# Patient Record
(demographics unavailable — no encounter records)

---

## 2017-06-28 NOTE — NUR
BROUGHT TO BED 4. PT REFERRED TO ER FROM PCP FOR EVALUATION OF ELEVATED BLOOD 
SUGAR. BLOOD SUGAR UPON ARRIVAL TO ER  521 POST-PRANDIAL

PT DENIES N/V/D; SKIN IS PINK/WARM/DRY; AAOX4 WITH EVEN AND STEADY GAIT; LUNGS 
CLEAR BL; HR EVEN AND REGULAR; PT DENIES ANY FEVER, CP, SOB, OR COUGH AT THIS 
TIME; PATIENT STATES PAIN OF 0/10 AT THIS TIME; VSS; PATIENT POSITIONED FOR 
COMFORT; HOB ELEVATED; BEDRAILS UP X2; BED DOWN. ER MD MADE AWARE OF PT STATUS.

## 2019-12-31 NOTE — NUR
Patient will be admitted to care of DR LUNA . Admited to MS.  Will go to room 
106A. Belongings list completed.  Report to DEIDRA MCMAHAN, STEVE MCMAHAN .

## 2019-12-31 NOTE — NUR
PT IN BED AOX4, SHE STATES MODERATE PAIN IN VAGINAL AREA AND STOMACH, AND SOME NAUSEA. PT 
HAD FINISHED 90% OF EVENING MEAL. PT GIVEN 1 TAB NORCO AND ZOFRAN FOR C/O OF NAUSEA. PT HAS 
2 IV SITES; 20G ON RAC AND L HAND 22G. BOTH IV SITES ARE INTACT AND ASYMPTOMATIC L HAND HAS 
22G RUNNING NORMAL SALINE AT 80MLS/HR. V/S AS FOLLOWS: T 98.4 P 83 R 18 B/P 129/62 02 97% ON 
ROOM AIR. FINGERSTICK . ALL UNIVERSAL PRECAUTIONS IN PLACE.

## 2019-12-31 NOTE — NUR
RECEIVED REPORT FORM DEIDRA MCMAHAN DAYSHIFT NURSE AT BEDSIDE FOR CONTINUITY OF CARE, PT IN 
STABLE CONDITION.

## 2019-12-31 NOTE — NUR
VANCOMYCIN 1 GM RECONSTITUTED WITH D5 HUNG AND RUNNING  AS ORDERED. EDUCATION 
REGARDING MEDICATION PROVIDED AT BEDSIDE INCLUDING SIDE EFFECTS. PT GIVEN ORDERED LANTUS AND 
HUMALOG COVERAGE AS PER S/S  FOR FINGERSTICK .

## 2019-12-31 NOTE — NUR
PATIENT ARRIVED FROM ER VIA WHEELCHAIR, ABLE TO AMBULATE TO [facility] BED. AAOX4, CALM, 
COOPERATIVE, SKIN COLOR APPROPRIATE TO ETHNICITY, WARM TO TOUCH. HAS RIGHT LABIA WOUND, 
DRESSING DRY AND INTACT. RESPIRATIONS EVEN, UNLABORED, ON ROOM AIR. REVIEWED PLAN OF CARE 
WITH PATIENT. PATIENT VERBALIZED UNDERSTANDING. ORIENTED PATIENT TO ROOM AND CALL LIGHT. 
SAFETY MEASURES IN PLACE. WILL CONTINUE TO MONITOR.

## 2019-12-31 NOTE — NUR
INTRODUCED SELF TO PATIENT. PT VOMITING AND COMPLAINING OF ABD PAIN 10/10. 
WAITING ON PAIN MEDS TO BE ORDERED FROM MD.

## 2019-12-31 NOTE — NUR
REEVALUATED PATIENTS PAIN/NAUSEA LEVEL. REPORTS DECREASED PAIN AND NAUSEA, 
RATING 5/10, SHARP ABDOMINAL PAIN.

## 2019-12-31 NOTE — NUR
PT LAYING IN BED, FAMILY AT BEDSIDE. PT C/O WORSENING ABD PAIN AND NAUSEA, DR GONSALVES AT BEDSIDE. PT WAS GIVEN MEDS AS ORDERED WITH EDUCATION, PT VERBALIZED 
UNDERSTANDING. ALL NEEDS MET.

## 2019-12-31 NOTE — NUR
BIB FAMILY FOR FOR WOUND CHECK. SEEN HERE 12/28/19 FOR BARTHOLIN'S ABSCESS . 
ALSO C/O N/V 4 EPISODES TODAY. BLOOD SUGAR 342 AT THIS TIME. ABD PAIN 10/10



MED HX: DM,  HTN, VAGINAL CYST.

## 2019-12-31 NOTE — NUR
PT LAYING IN BED, FAMILY AT BEDSIDE. PT REPORTS IMPROVEMENT IN PAIN RELIEF, 
TOLERABLE 5/10 PAIN ON LOWER ABD. DENIES NAUSEA. VS NOTED. ALL NEEDS MET.

## 2020-01-01 NOTE — NUR
RECEIVED PT IN STABLE CONDITION FROM AM NURSE. AWAKE,ALERT AND ORIENTED X4. ON M/S .FAMILY 
AT BEDSIDE. WITH IVF INFUSING WELL ON THE LT HAND G#18. CLEAR AND PATENT.  PLAN OF CARE 
DISCUSSED AND VERBALIZED UNDERSTANDING. BED ON LOW POSITION. FREQ ROUNDS ,SIDE RAILS UP X2. 
CALL LIGHT PLACED WITHIN EASY REACH. WITH NO C/O PAIN AT THIS TIME. WILL CONTINUE TO 
MONITOR.

## 2020-01-01 NOTE — NUR
HOURLY ROUNDING. FAMILY AT BEDSIDE. PT RESTING IN BED UPON ARRIVAL. RESPIRATIONS EVEN AND 
UNLABORED WITH NO SOB OR RESPIRATORY DISTRESS. SKIN WARM AND DRY. ABLE TO MAKE NEEDS KNOWN. 
SAFETY MEASURES IN PLACE. WILL CONTINUE TO MONITOR

## 2020-01-01 NOTE — NUR
BLOOD SUGAR WAS CHECKED RESULT 80. PROVIDED WITH SOME SNACK. TOLERATED WELL. WILL CONTINUE 
TO MONITOR.

## 2020-01-01 NOTE — NUR
PT AGITATED AND HAS C/O OF 8/10 PAIN SHE WAS ALSO COMPLAINING OF UNRELIEVED NAUSEA. MD LUNA 
PAGED AWAITING CALL BACK.

## 2020-01-01 NOTE — NUR
PT IN BED RESTING WITH EYES CLOSED BUT AROUSABLE TO NAME. PT HAS NO C/O OF PAIN AND NO S/S 
OF PAIN OR DISTRESS AT THIS TIME. IV SITE INTACT AND RUNNING NS AS ORDERED. V/S AS FOLLOWS: 
T 97.0 P 79 R 18 B/P 153/90 02 98% ON ROOM AIR.ALL UNIVERSAL PRECAUTIONS IN PLACE .

## 2020-01-01 NOTE — NUR
[facility]. IT DID NOT MEET THE CRITERIA 
OF HIGH NUTRITION RISK PER HOSPITAL POLICY. PT WILL BE ASSESSED PER HOSPITAL POLICY. 



1/1/20 



FRANCI COX [address]

## 2020-01-01 NOTE — NUR
PT IS CRYING IN PAIN AND FEELS THE NORCO IS NOT WORKING. DR. LUNA WAS PAGED ABOUT CHANGING 
HER PAIN MEDICATION SO THAT HER PAIN IS TOLERABLE. WILL CONTINUE TO MONITOR

## 2020-01-01 NOTE — NUR
HOURLY ROUNDING. PT ASLEEP IN BED WITH FAMILY AT BEDSIDE. RESPONSIVE TO VERBAL AND TACTILE 
STIMULI. ABLE TO MAKE NEEDS KNOWN. SKIN WARM AND DRY TO TOUCH. RESPIRATIONS EVEN AND 
UNLABORED WITH NO SOB OR RESPIRATORY DISTRESS. SAFETY MEASURES IN PLACE. FREQ ROUNDS MADE. 
WILL CONTINUE TO MONITOR.

## 2020-01-01 NOTE — NUR
PT CALLED AND COMPLAINED OF NAUSEA. PRN ANTI-NAUSEA MEDICATION PRESCRIBED AS PER MD ORDER. 
PT TOLERATED WELL. MEDICATION EDUCATION PERFORMED. PT VERBALIZED UNDERSTANDING AND COULD 
TEACH BACK

## 2020-01-01 NOTE — NUR
MD LUNA WAS PAGED AND ADDITION TIME AND STILL AWAITING A RETURN CALL. PT WAS GIVEN IVP/PRN  
ATIVAN FOR AGITATION.

## 2020-01-01 NOTE — NUR
HOURLY ROUNDING. PT RESTING IN BED UPON ARRIVAL WITH FAMILY AT BEDSIDE. ABLE TO MAKE NEEDS 
KNOWN. SKIN WARM AND DRY TO TOUCH. RESPIRATIONS EVEN AND UNLABORED WITH NO SOB OR 
RESPIRATORY DISTRESS. SAFETY MEASURES IN PLACE. WILL CONTINUE TO MONITOR

## 2020-01-01 NOTE — NUR
ENDORSED TO NIGHTSHIFT NURSE. PT RESTING IN BED UPON ARRIVAL WITH FAMILY AT BEDSIDE. ABLE TO 
MAKE NEEDS KNOWN. SKIN WARM AND DRY TO TOUCH. RESPIRATIONS EVEN AND UNLABORED WITH NO SOB OR 
RESPIRATORY DISTRESS. SAFETY MEASURES IN PLACE. PT IS STABLE

## 2020-01-01 NOTE — NUR
ADMINISTERED MEDICATION AS PRESCRIBED PER MD ORDER. PT TOLERATED WELL. MEDICATION EDUCATION 
PERFORMED. PT VERBALIZED UNDERSTANDING AND COULD TEACH BACK. BLOOD SUGAR . SAFETY 
MEASURES IN PLACE. WILL CONTINUE TO MONITOR

## 2020-01-01 NOTE — NUR
PT C/O PAIN AND NAUSEA WAS GIVEN PRN NORCO AND PRN IVP ZOFRAN. PT FINGERSTICK , SHE 
WAS GIVEN 4 UNITS OF COVERAGE AND ORDERED HUMALOG. ALL UNIVERSAL PRECAUTIONS IN PLACE.

## 2020-01-01 NOTE — NUR
BLOOD SUGAR OBTAINED AND RESULTED . 20 UNITS OF LISPRO HELD. PT TOLERATED WELL. SAFETY 
MEASURES IN PLACE.

## 2020-01-01 NOTE — NUR
RECEIVED BEDSIDE REPORT FROM NIGHTSHIFT NURSE. PT ASLEEP IN BED UPON ARRIVAL. SKIN WARM AND 
DRY TO TOUCH. RESPONSIVE TO VERBAL AND TACTILE STIMULI. ABLE TO MAKE NEEDS KNOWN. 
RESPIRATIONS EVEN AND UNLABORED WITH NO SOB OR RESPIRATORY DISTRESS. IV SITE IN RAC 22G AND 
L HAND 22G ARE CLEAN, DRY, AND INTACT. SAFETY MEASURES IN PLACE. WILL CONTINUE TO MONITOR.

## 2020-01-01 NOTE — NUR
PT HAS BEEN SCREENED AND CATEGORIZED AS HIGH NUTRITION RISK. PT WILL BE SEEN WITHIN 1-2 DAYS 
OF ADMISSION. 



1/1/20-1/2/20



FRANCI COX RD

## 2020-01-01 NOTE — NUR
DR. LUNA CALLED BACK AND INSTRUCTED TO ADMINISTER 6MG IV MORPHINE Q4H FOR SEVERE PAIN PRN 
INSTEAD OF THE NORCO.

## 2020-01-01 NOTE — NUR
PT CALLED AND COMPLAINED OF PAIN. PRN PAIN MEDICATION ADMINISTERED AS PER MD ORDER. PT 
TOLERATED WELL. VERBALIZED UNDERSTANDING. SAFETY MEASURES IN PLACE. WILL CONTINUE TO 
MONITOR.

## 2020-01-01 NOTE — NUR
YONI HUNG AND RUNNING AS ORDERED. PT C/O PAIN AND NAUSEA, SHE  WAS GIVEN 1 TAB OF NORCO 
PO/PRN AND IVP ZOFRAN FOR NAUSEA. PT VOMITED X1 . V/S AS FOLLOWS: T 98.1 P 79 R 18 B/P 
148/78 02 94% ON ROOM AIR. VAGINAL LABIA NOTED WITH REDNESS AND MINIMAL AMOUNT OF YELLOW 
DRAINAGE. UNIVERSAL FALLS PRECAUTIONS IN PLACE. 

-------------------------------------------------------------------------------

Addendum: 01/01/20 at 0426 by Libia Rodrigues RN

-------------------------------------------------------------------------------

TIME OF NOTES 0050

## 2020-01-01 NOTE — NUR
HOURLY ROUNDING. PT ASLEEP IN BED. RESPONSIVE TO VERBAL AND TACTILE STIMULI. ABLE TO MAKE 
NEEDS KNOWN. SKIN WARM AND DRY TO TOUCH. RESPIRATIONS EVEN AND UNLABORED WITH NO SOB OR 
RESPIRATORY DISTRESS. SAFETY MEASURES IN PLACE. FREQ CHECKS MADE

## 2020-01-02 NOTE — NUR
RECEIVED PT REPORT FROM NIGHT RN. PT IS FULL CODE, NKA. IV IS ON LEFT FA 18G WITH NS 
INFUSING AT 80. DR. WEI AT BEDSIDE,MOTHER AT BEDSIDE. PT IS AMBULATORY, AAOX4. ON RA, 
WILL REVIEW AND CONTINUE WITH PLAN OF CARE FOR THE DAY.

## 2020-01-02 NOTE — NUR
Late entry. Confirmed with RN that 0.9 NS IV completed at 1300, another 0.9 NS 
completed at 1545, another 0.9 NS completed at 1650. Rocephin IVPB completed at 
1640.

## 2020-01-02 NOTE — NUR
GAVE REPORT TO NIGHT SHIFT RN. ENDORSED PT TO NIGHT SHIFT FOR CONTINUITY OF CARE. PT IN 
STABLE CONDITION.

## 2020-01-02 NOTE — NUR
ADMINISTERED MORNING MEDICATIONS. GAVE ZOFRAN FOR NAUSEA AND VOMITING. GAVE MORPHINE FOR 
PAIN 10/10. WILL REASSES PAIN LEVEL

## 2020-01-02 NOTE — NUR
RESULT OF VANCOMYCIN TROUGH LEVEL 14.7. TALKED TO SHARDA,PHARMACIST  . HE SAID OK TO GIVE 
THE DOSE TONIGHT.

## 2020-01-02 NOTE — NUR
GAVE PT ZOFRAN AND MORPHINE DUE TO PAIN AND NAUSEA/VOMITING. FAMILY AT BEDSIDE. NO OTHER 
COMPLAINTS AT THIS TIME.

## 2020-01-02 NOTE — NUR
RECEIVED PT IN STABLE CONDITION FROM AM NURSE. MED SURG PT. AWAKE,ALERT AND ORIENTED X4. 
STILL WITH C/O PAIN ANS NAUSEA. DR. WEI CAME AND SAID WILL ORDER  ROUTINE PAIN MED . 
WITH IVF INFUSING WELL ON THE LT HAND g#22. CLEAR AND PATENT.  AMBULATORY TO THE BATHROOM. 
BED ON LOW POSITION . SIDE RAILS UP X2. CALL LIGHT PLACED WITHIN EASY REACH. WILL FOLLOW UP 
NEW MD ORDERS AND WILL CONTINUE TO MONITOR.

## 2020-01-02 NOTE — NUR
PT A LITTLE ANXIOUS. ASSIST TO TAKE DEEP BREATH  AND RELAX.  IT SEEMS TO HELP. PT READY TO 
GET SOME ASLEEP.

## 2020-01-02 NOTE — NUR
ABLE TO TALKED TO DR. WEI . CLARIFIED WITH HIM ABOUT THE TORADOL. HE SAID TO START 
TONIGHT. HE WAS MADE AWARE ALSO THAT PT REFUSED THE PERCOCET FOR SHE ALWAYS VOMIT EVERY TIME 
SHE TOOK PILLS.

## 2020-01-02 NOTE — NUR
BLOOD SUGAR , NO INSULIN NEEDED. WITHHELD SCHEDULED HUMALOG 20 UNITS FOR BLOOD SUGAR 
132 WITHIN NORMAL LIMITS.

## 2020-01-02 NOTE — NUR
Note:



SW met with patient at bedside to complete assessment. Patient was sedated and was unable to 
complete assessment. SW will follow up with patient at a later time.

-------------------------------------------------------------------------------

Addendum: 01/02/20 at 1402 by Tony Szymanski 

-------------------------------------------------------------------------------

Basic Screen: 

Yes

High Risk DC Screen 

No

 Name: 

MARCO DINERO

[place] Tel: 

[phone]

Relationship: 



Pre-Admission Living Arrangements: 

Lives with Other

Prior ADL 

Needs Assistance

Current Home Health Name/Tel: 

N/A

Current DME/02 Name/Tel: 

N/A

Current Hospice Name/Tel: 

N/A

Current Dialysis Name/Tel: 

N/A

Healthcare Decision Maker: 

Patient

Advance Directive 

No - REFUSED

Physician Orders for Life Sustaining Treatment Form 

No

Patient/Family Have Educational Needs 

No

Information Taught: 

 Advance Directive

Person Taught: 

Patient

Spouse

Teaching Tools: 

Verbal

Factors Affecting Learning: 

None

Participation Level: 

Refused

Evaluation: 

Verbalizes Understanding

Needs Additional Education: 

No

Discipline: 

Case Mgt/Social Svcs

Tentative Discharge Plan/Destination: 

No Needs Identified

Will require assistance post discharge: 

No

 Referred to : 

No

Tentative Discharge Plan Summary: 

Patient is a 25-year-old female admitted for hyperglycemia. Patient has 

PMHX of obesity. Patient was admitted from home. SW met with patient and 

patient's  Marco Dinero at bedside to verify demographics. 

Patient reported that she seens Dr. Jay Ward and was last seen 2 

weeks ago. Patient stated that she lives at her address with , 

mother, and brother. Patient stated that she needs assistance with 

bathing. Patient reported mental health history of anxiety and depression. 

SW provided patient mental health resources. Patient reported no history 

of substance abuse. SW provided education on advanced directive but 

patient refused. Patient stated that her plan after discharge is to return 

home. No further needs identified.

 Signature: 

KADEEM Salinas

Date: 

Jan 2, 2020

Time: 

13:58

## 2020-01-03 NOTE — NUR
DISCHARGE PLANNIN Y/O FEMALE PATIENT FROM HOME, WHO CAME IN DUE S/P I&D FOR A BARTHOLIN'S ABSCESS 3 DAYS 
PRIOR.  NO PERTINENT MEDICAL HISTORY.  INITIAL DIAGNOSIS OF HYPERGLYCEMIA.  CURRENT LABS 
INCLUDE WBC 11.6, H/H 8.7/26.7, NA/K 136/4.3, BUN/CREA 18/2.2 AND ALB 1.3.  CT ABD AND 
PELVIS SHOWED A SMALL SUPERFICIAL ABSCESS AND PROMINENT INGUINAL LYMPH NODES.  CURRENT MEDS 
INCLUDE ON VANCO AND LEVOFLOXACIN. DC PLAN IS PENDING ON PATIENT'S RESPONSE TO TREATMENT.

-------------------------------------------------------------------------------

Addendum: 20 at 0858 by Reyna Murphy CM

-------------------------------------------------------------------------------

DC PLANNING:

 I & D PERFORMED BY DR DIMPLE ALFREDO ,CONTINUE IV LEVAQUIN AND FLAGYL DC PLAN TO GO HOME WHEN 
STABLE CM TO FOLLOW 

-------------------------------------------------------------------------------

Addendum: 20 at 1403 by Reyna Murphy CM

-------------------------------------------------------------------------------

DC PLANNING:

FAXED THE WOUND VAC  AND HOME HEALTH REQUEST TO [facility] 

-------------------------------------------------------------------------------

Addendum: 20 at 1604 by Reyna Murphy CM

-------------------------------------------------------------------------------

DC PLANNING:

 SPOKE WITH TOMA MILLER REGARDING HOME HEALTH FOR WOUND CARE AND WOUND VACK CONTACTED [facility] SPOKE WITH [place]  AND FAXED ALL THE INQUIRY  [phone]

-------------------------------------------------------------------------------

Addendum: 20 at 4475 by Reyna Murphy CM

-------------------------------------------------------------------------------

DC PLANNING:

 I RECEIVED A CALL FROM [facility] , STATED THEY ARE UNABLE TO ACCEPT 
PATIENT, [facility] CAN NOT ACCEPT PT . WILL TRY OTHER HOME HEALTH 


-------------------------------------------------------------------------------

Addendum: 20 at 1359 by Elizabeth Reyes 

-------------------------------------------------------------------------------

I faxed referral to [facility] home health, [facility] [place] [facility], fax [phone], phone 
number [phone]. Per Sakshi from [facility] [phone], they are able to 
accept referral and they will send a nurse to patient's home. She stated they will obtain 
authorization from insurance.



I called and spoke with  Kristyn Washington from [facility] 
[phone], she requested wound vac order, fax [phone]. I faxed order and HNP. 

-------------------------------------------------------------------------------

Addendum: 20 at 1611 by Reyna Murphy 

-------------------------------------------------------------------------------

DC PLANNING:

CALLED ALIYA AT [facility] AND CLARIFIED THE IF PT CAN GO WITH THE SAME WOUND VAC AND OK TO GET THE 
AUTHORIZATION. PER ALIYA CEDILLO TO DC PATIENT WITH THE SAME WOUND VAC HE ALREADY GOT THE 
AUTHORIZATION FROM [facility][note 2], AND HE WILL CONTACT THE [facility]

## 2020-01-03 NOTE — NUR
WOUND CARE EVALUATION NOTES:

REASON FOR EVALUATION:  S/P I&D WOUND

WOUND ASSESSMENT DONE WITH THIS 26 Y/O FEMALE PATIENT ADMITTED TO [facility] WITH INITIAL 
DIAGNOSIS OF S/P I&D ABSCESS.  PAST MEDICAL HX DM AND HX OF BARTHOLINS CYST. ALL ABOVE 
INFORMATION WAS OBTAINED FROM THE ADMISSION H&P. AND PT. PT IS AAX4. MOTHER AT BED SIDE. 
DRESSING CHANGED AND WOUND CARE INSTRUCTIONS GIVEN WITH DEMONSTRATION. BOTH PT. AND MOTHER 
VERBALIZES UNDERSTANDING. PLAN OF CARE DISCUSSED WITH PRIMARY RN AND PT. PT. VERBALIZES 
UNDERSTANDING. POC DISCUSSED WITH DR. LUNA, PER DR. LUNA WILL HAVE SURGEON FOR FOLLOW UP.



INTEGUMENTARY:

-INCISION WOUND TO RIGHT LABIA, 2X3X0.8CM WOUND BED YELLOW AND BROWN TISSUE, SMALL AMOUNT 
PURULENT DRAINAGE, MILD ODOR, WOUND EDGE FLAT, WITH GEORGE-WOUND SKIN DARKER ERYTHEMA, 
INDURATION, WARM AND PAINFUL TO TOUCH 



RECOMMENDATIONS:

-SURGEON TO FOLLOW UP

-COLD COMPRESS PER PROTOCOL TO RIGHT LABIAL AREA

-CLEANSE RIGHT LABIA WOUND WITH NS. PAT DRY APPLY SILVASORB GEL AND COVER WITH DRY DRESSING, 
QD AND PRN IF SOILING

-KEEP AREA DRY AND CLEAN AT ALL TIME

-PLEASE FOLLOW UP WITH PCP 7-10 DAYS AFTER DISCHARGED



RECOMMENDATIONS DISCUSSED WITH PRIMARY RN. 

PLEASE CONTACT WOUND CARE NURSE FOR ANY QUESTIONS AND CHANGES IN SKIN CONDITION.

## 2020-01-03 NOTE — NUR
PT C/O PAIN . REFUSED THE PERCOCET AND TORADOL NOT DUE YET. MORPHINE IVP GIVEN AS ORDERED 
PRN FOR PAIN 10/10 . WILL CONTINUE TO MONITOR.

## 2020-01-03 NOTE — NUR
PT C/O NAUSEA. MEDICATED WITH ZOFRAN IVP. AFTER PT ALSO GIVEN ROUTINE PAIN MED TORADOL. WILL 
CONTINUE TO MONITOR..

## 2020-01-03 NOTE — NUR
AM HUMALOG NOT GIVEN BLOOD SUGAR 135. BUT PT UNABLE TO EAT DUE TO NAUSEA VOMITING. WILL 
ENDORSE TO AM NURSE.

## 2020-01-03 NOTE — NUR
RECEIVED FROM AM RN IN BED SITTING UP AWAKE AND ALERT. MALE VISITOR AT BEDSIDE. PT. 
VERBALIZING WELL. NO COMPLAINTS AT THIS TIME. IVF SITE INTACT AND NO S/S OF INFILTRATION. 
CALL LIGHT WITH IN REACH AND CARE PLANS FOR THE NIGHT DISCUSSED WITH HER. ENCOURAGED TO CALL 
FOR ANY HELP SHE MAY NEED. PT. FOR I AND D RIGHT LABIAL ABSCESS  TOMORROW AS PLANNED WITH 
MD. JD MUSA.

## 2020-01-03 NOTE — NUR
PT. CRYING AT THIS TIME AND WANTING PAIN RELIEVER. MEDICATED AS REQUESTED RT PAIN TO RIGHT 
VAGINAL LABIA. FOR I AND D TOMORROW BY MD. JD MUSA

## 2020-01-03 NOTE — NUR
RECEIVED PT IN STABLE CONDITION FROM NIGHT SHIFT NURSE. MED SURG PT. AAOX4. STILL WITH C/O 
PAIN ANS NAUSEA. WITH IVF INFUSING WELL ON THE LT HAND g#22. CLEAR AND PATENT.  AMBULATORY 
TO THE BATHROOM. BED ON LOW POSITION . SIDE RAILS UP X2. CALL LIGHT PLACED WITHIN EASY 
REACH. WILL FOLLOW UP NEW MD ORDERS AND WILL CONTINUE TO MONITOR.

## 2020-01-03 NOTE — NUR
SPOUSE WENT HOME AND MOTHER CAME IN TO VISIT. ADMINISTERED ANTI NAUSEA MEDICATION ZOFRAN IVP 
RT "I FEEL LIKE VOMITING" AWAKE AND LAERT. ABLE TO VERBALIZE NEEDS WELL.

## 2020-01-04 NOTE — NUR
GIVEN 2 UNITS INSULIN FOR BLOOD SUGAR 153 IN THE LEFT INNER ARM. PATIENT TOLERATED WELL. 
WILL CONTINUE TO MONITOR

## 2020-01-04 NOTE — NUR
VITAL SIGNS TAKEN. PATIENT C/O PAIN 7/10 RIGHT LABIAL AREA. PATIENT WAS MEDICATED WITH 
MORPHINE 1509. PATIENT RESPIRATIONS EVEN AND UNLABORED. MOM AT BEDSIDE. WILL CONTINUE TO 
MONITOR

## 2020-01-04 NOTE — NUR
SLEEPING. MOTHER AT BEDSIDE. PT. AWARE OF NPO STATUS FOR I AND D PROCEDURE THIS A.M. CONSENT 
FOR I AND D SIGNED BY PT.

## 2020-01-04 NOTE — NUR
GIVEN MORPHINE AND PHENERGAN VIA IVP. EXPLAINED TO PATIENT INDICATIONS. PATIENT VERBALIZED 
UNDERSTANDING. WILL REASSESS PAIN. MOM IS AT BEDSIDE

## 2020-01-04 NOTE — NUR
VITAL SIGNS TAKEN. 2X2 NECROTIC TISSUE NOTED IN THE RIGHT LABIAL AREA. PATIENT STATED PAIN 
IN THE AREA 10/10. WILL CONTINUE TO MONITOR. BED IN LOW POSITION. CALL LIGHT IS WITHIN 
REACH.  AT BEDSIDE

## 2020-01-04 NOTE — NUR
GIVEN PERCOCET PO, CRUSHED IN APPLESAUSE. GIVEN TORADOL VIA IVP. EDUCATED PATIENT REGARDING 
MEDS AND SIDE EFFECTS. PATIENT TOLERATED WELL. PATIENT VERBALIZED UNDERSTANDING. WILL 
CONTINUE TO MONITOR. BED IN LOW POSITION. CALL LIGHT WITHIN REACH.  AT BEDSIDE

## 2020-01-04 NOTE — NUR
RECEIVED PATIENT FROM NIGHT SHIFT NURSE. PATIENT IS LAYING IN BED, FACIAL GRIMACING, CRYING, 
AND RESTLESS. PATIENT STATED LABIAL PAIN OF 10/10, ACHING. AAOX4. RESPIRATIONS EVEN AND 
UNLABORED, ON ROOM AIR. VISIBLE CHEST RISE NOTED. ABDOMEN SOFT AND NONTENDER. N/V NOTED. IV 
ON THE LEFT FOREARM RUNNING NS AT 80 ML/HR. IV PATENT AND INTACT. RIGHT LABIAL SWELLING 
NOTED. PATIENT IS IN BEDREST. BED IN LOW POSITION. CALL LIGHT IS WITHIN REACH

## 2020-01-04 NOTE — NUR
GIVEN MORPHINE FOR LABIAL PAIN 10/10 AND AND ZOFRAN FOR N/V. EXPLAINED TO PATIENT 
INDICATIONS AND SIDE EFFECTS. PATIENT VERBALIZED UNDERSTANDING. WILL CONTINUE TO MONITOR

## 2020-01-04 NOTE — NUR
SLEPT WELL WITH MOTHER AT BEDSIDE. REQUESTING FOR ANTI NAUSEA RT STATED SHE REALLY FEEL 
NAUSEOUS AFTER THE PAIN RELIEVERS. PT. STATED SHE DO NOT WANT ANY PAIN RELIEVERS FOR NOW.

## 2020-01-04 NOTE — NUR
1/4/2020 RD INITIAL ASSESSMENT COMPLETED

PLEASE REFER TO NUTRITION ASSESSMENT UNDER CARE ACTIVITY FOR ESTIMATED NUTRITIONAL NEEDS. 



RD RECOMMENDATIONS:



1. WHEN MEDICALLY APPROPRIATE, CONSIDER CLEAR LIQUID DIET AND ADVANCE AS TOLERATED TO CCHO 
60 GM DIET. 

2. IF PT CONTINUES WITH POOR PO INTAKE, CONSIDER GLUCERNA SHAKE BID TO OPTIMIZE NUTRITION 
INTAKE. 

3. RD WILL F/U 2-3 DAYS; HIGH RISK. 



JE SAUCEDO, AKIL

## 2020-01-04 NOTE — NUR
RECEIVED FROM AM RN IN BED SLEEPING. MOTHER AT BEDSIDE. CALL LIGHT WITH IN REACH. NO SOB. NO 
RESTLESSNESS. IVF SITE INTACT AND NO INFILTRATIONNOTED.

## 2020-01-05 NOTE — NUR
GIVEN SCHEDULED TORADOL AND PERCOCET. /56, HR 78, O2SAT 95%. EXPLAINED TO PATIENT 
INDICATION. PATIENT VERBALIZED UNDERSTANDING. MOM OF PATIENT IS AT BEDSIDE.

## 2020-01-05 NOTE — NUR
GIVEN NORCO FOR PAIN. GIVEN AMLODIPINE FOR HTN. EXPLAINED TO PATIENT INDICATIONS AND SIDE 
EFFECTS. PATIENT VERBALIZED UNDERSTANDING. WILL CONTINUE TO MONITOR

## 2020-01-05 NOTE — NUR
RECEIVED BEDSIDE REPORT FROM AM SHIFT NURSE. PATIENT IS LYING ON BED, AWAKE AND ALERT WITH 
FAMILY MEMBER AT BEDSIDE. NO SOB OR DISTRESS NOTED. ON ROOM AIR. IV ACCESS ON LEFT HAND, 
PATENT AND INTACT. ROBB CATHETER IN PLACE. BED IN LOW, SAFETY MEASURES IN PLACE. ON CONTACT 
PRECAUTION. BOARD UPDATED. CALL LIGHT PLACED WITHIN PATIENT REACH. WILL CONTINUE TO MONITOR 
PATIENT.

## 2020-01-05 NOTE — NUR
CHANGED SURGICAL SITE DRESSING PER MD ORDER. PACKED WOUND WITH KERLEX, WET TO DRY. SEROUS 
DRAINAGE NOTED. MEDICATED PATIENT WITH MORPHINE PRIOR. PATIENT TOLERATED DRESSING CHANGED 
WELL.

## 2020-01-05 NOTE — NUR
PATIENT BLEEDING ON THE SURGICAL SITE. CHANGED DRESSING. COVERED WITH ABDOMINAL PAD. PATIENT 
REPORTS NO DIZZINESS. PATIENT REPORT PAIN 8/10 IN THE SURGICAL SITE. WILL MEDICATE

## 2020-01-05 NOTE — NUR
MEDICATED WITH ONLY 1 TABLET OF PAIN RELIEVER AS REQUESTED AND TORADOL IVP . ANTI NAUSEA 
ADMINISTERED. ABLE TO VERBALIZE NEEDS WELL. BLOOD SUGAR CHECK DONE PER FINGERSTICK AND WNL. 
CALL LIGHT WITH IN REACH.

## 2020-01-05 NOTE — NUR
RECEIVED PATIENT FROM NIGHT SHIFT NURSE. PATIENT IS LAYING IN BED. AAOX4. RESPIRATIONS EVEN 
AND UNLABORED, ON ROOM AIR. VISIBLE CHEST RISE NOTED. ABDOMEN SOFT AND NONTENDER. IV ON THE 
LEFT FOREARM RUNNING NS AT 80 ML/HR. IV PATENT AND INTACT. S/P RIGHT LABIAL I&D. DRESSING 
INTACT. NO DRAINAGE NOTED. PATIENT IS IN BEDREST. BED IN LOW POSITION. CALL LIGHT IS WITHIN 
REACH

## 2020-01-05 NOTE — NUR
PT. URINATED IN BEDSIDE COMMODE. DRESSING OUT AND WET WITH URINE AND BLOOD. NEW DRESSING IN 
PLACE. CLEANED PT. AND MADE COMFORTABLE. MOTHER AT BEDSIDE. CALL LIGHT WITH IN REACH. ABLE 
TO VERBALIZE NEEDS WELL.

## 2020-01-05 NOTE — NUR
GIVEN VANCO VIA IVPB. EXPLAINED TO PATIENT MED. PATIENT VERBALIZED UNDERSTANDING. WILL 
CONTINUE TO MONITOR

## 2020-01-05 NOTE — NUR
GIVEN ZOSYN VIA IVPB AND MORPHINE VIA IVP. BP /55, HR 76, OSAT 95%. EXPLAINED TO 
PATIENT INDICATION. PATIENT VERBALIZED UNDERSTANDING. WILL CONTINUE TO MONITOR

## 2020-01-05 NOTE — NUR
ROUNDS DONE AT THIS TIME. PATIENT IS RESTING WITH EYES CLOSED. NO DISTRESS NOTED. WILL 
CONTINUE TO MONITOR PATIENT.

## 2020-01-05 NOTE — NUR
INSERTED ROBB CATHETER 16Fr TO KEEP SURGICAL SITE FROM CONTAMINATION. EXPLAINED TO PATIENT 
AND  THE PROCEDURE. BOTH VERBALIZED UNDERSTANDING. CLEAR, YELLOW URINE FLOWED IN THE 
BAG. PATIENT IS IN PAIN, CRYING. GIVEN NORCO PRIOR TO INSERTION.

## 2020-01-05 NOTE — NUR
GIVEN MORPHINE AND PHENERGAN VIA IVP. EXPLAINED TO PATIENT INDICATIONS AND SIDE EFFECTS. 
PATIENT VERBALIZED UNDERSTANDING. WILL REASSESS PAIN AND N/V

## 2020-01-05 NOTE — NUR
VITAL SIGNS TAKEN. INFORMED HER THAT SHE HAS ROUTINE PAIN RELIEVERS AND IF SHE WANTS IT. 
STATED" I CAN REALLY TOLERATE IT BUT IT IS JUST WHEN I STAND UP AND URINATE THAT IT HURTS." 
MEDICATED WITH PAIN RELIEVERS AS ORDERED.

## 2020-01-06 NOTE — NUR
WOUND CARE RE-EVALUATION NOTES:

REASON FOR EVALUATION: S/P I&D WOUND

WOUND ASSESSMENT COMPLETED WITH THIS 24 Y/O FEMALE PATIENT S/P I&D AND DEBRIDEMENT OF RIGHT 
LABIAL ABSCESS ON 01/04/20. PATIENT IS AAOX4, COOPERATIVE, AMBULATORY, FAMILY MEMBER AT 
BEDSIDE. SKIN IS WARM TO TOUCH, COLOR APPROPRIATE TO ETHNICITY. ROBB CATHETER IN PLACE, 
INTACT, AND DRAINING YELLOW URINE. WOUND DRESSING CHANGED AND WOUND INSTRUCTIONS GIVEN WITH 
DEMONSTRATION TO PATIENT/FAMILY MEMBER AT BEDSIDE. PLAN OF CARE DISCUSSED WITH 
PATIENT/FAMILY MEMBER AT BEDSIDE AND WITH PRIMARY RN. PATIENT/FAMILY VERBALIZED 
UNDERSTANDING.  

ENCOURAGED FREQUENT AMBULATION. PATIENT VERBALIZED UNDERSTANDING. 



INTEGUMENTARY:

- SURGICAL WOUND TO RIGHT LABIA WITH MEASUREMENTS 10 X 3 X 3 CM. WOUND BED IS CLEAN WITH 
100% GRANULATION, NO ODOR. SMALL SEROSANGUINEOUS DRAINAGE. GEORGE-WOUND SKIN ERYTHEMA, INTACT.





RECOMMENDATIONS:

- WOUND VAC RECOMMENDATION, PENDING ORDER.

- CLEANSED SURGICAL WOUND WITH NS, PAT DRIED, PACKED WITH MOIST TO DRY DRESSING, AND COVERED 
WITH DRY DRESSING DAILY AND PRN IF SOILED. 

- CONTINUE TO KEEP AREA DRY AND CLEAN AT ALL TIMES.

- HOME HEALTH FOLLOW-UP UPON DISCHARGE FOR WOUND VAC TREATMENT.

- FOLLOW-UP WITH SURGEON UPON DISCHARGE.


-------------------------------------------------------------------------------

Addendum: 01/08/20 at 1157 by ALONSO Herron RN (Grace)

-------------------------------------------------------------------------------

CLARIFICATION ORDER

-[facility] VAC THERAPY TO RIGHT LABIA SURGICAL WOUND, FOLLOW V.A.C THERAPY CLINICAL GUIDELINES. 
SET THERAPY ON CONTINUOUS THERAPY  [facility]. CHANGE DRESSING 3X/WK ON MONDAY, WEDNESDAY 
AND FRIDAY, AND PRN IS DISLODGED.

- TO ARRANGE HOME HEALTH NURSE TO FOLLOW UP WOUND VAC DRESSING CHANGE.

## 2020-01-06 NOTE — NUR
ASSISTED WOUND CARE NURSE AT BEDSIDE APPLYING WOUND VAC. PT TOLERATED WELL. BED IN LOW 
POSITION. CALL LIGHT AT BEDSIDE. PT EDUCATED.

## 2020-01-06 NOTE — NUR
GAVE ORDERED DUE MEDICATIONS AT THIS TIME. PT TOLERATED WELL. BED IN LOW POSITION. CALL 
LIGHT AT BEDSIDE.

## 2020-01-06 NOTE — NUR
PT IN BED V/S AS FOLLOWS: T 97.7 P 80 R 18 B/P 154/75 02 93% ON ROOM AIR. PT SITTING UP IN 
BED AOX4, IV SITE INTACT AND ASYMPTOMATIC RUNNING NORMAL SALINE AT 80MLS/HR.WOUND VAC INTACT 
AND DRAINING A VERY SMALL AMOUNT OF SEROSANGUINEOUS DRAINAGE. ALL CONTACT AND UNIVERSAL 
FALLS PRECAUTIONS IN PLACE.

## 2020-01-06 NOTE — NUR
PT FINGERSTICK , GIVEN  4 UNITS OF HUMALOG COVERAGE AS ORDERED. ZOSYN [id number] AND 
RUNNING AS ORDERED.

## 2020-01-06 NOTE — NUR
WOUND VAC APPLY PER MANUFACTURE GUIDELINES, PT. TOLERATE PROCEDURES WELL, NO C/O PAIN, ALL 
QUESTIONS ANSWERED.

## 2020-01-06 NOTE — NUR
RECEIVED REPORT FROM NIGHT SHIFT NURSE FOR CONTINUITY OF CARE. PT IN STABLE CONDITION. 
RESPIRATIONS EVEN AND UNLABORED, ROOM AIR. IV INTACT AND PATENT. SAFETY MEASURES IN PLACE. 
BED IN LOW POSITION. CALL LIGHT AT BEDSIDE. PT ORIENTED TO ROOM. WILL CONTINUE TO MONITOR.

## 2020-01-06 NOTE — NUR
VITALS TAKEN AT THIS TIME. NO DISTRESS NOTED. CALL LIGHT WITHIN PATIENT REACH. WILL CONTINUE 
TO MONITOR PATIENT.

## 2020-01-06 NOTE — NUR
PT AGAIN C/O OF NAUSEA AND WAS GIVEN PHENERGAN /PRN /IVP AS WELL AS ORDERED PERCOCET AND 
TORADOL. FINGERSTICK , PT GIVEN 2 UNITS OF HUMALOG COVERAGE.

## 2020-01-06 NOTE — NUR
PT SLEEP AT THIS TIME. RESPIRATIONS EVEN AND UNLABORED, ROOM AIR. BED IN LOW POSITION. CALL 
LIGHT AT BEDSIDE.

## 2020-01-06 NOTE — NUR
PT GIVEN ORDERED  AND SCHEDULED PERCOCET 2 TABS OF 5/325 AS WELL AS ORDERED TORADOL IVP. V/S 
AS FOLLOWS: T 97.7 P 80 R18 B/P 154/75 02 93% ON ROOM AIR.

## 2020-01-06 NOTE — NUR
ENDORSED TO AM SHIFT NURSE FOR CONTINUITY OF CARE. CALL LIGHT WITHIN PATIENT REACH.  NO 
DISTRESS NOTED.

## 2020-01-07 NOTE — NUR
ADMINISTERED MEDS. PATIENT TOLERATED WELL. EDUCATED ON MEDS. WILL CONTINUE TO MONITOR THE 
PATIENT. WOUND VAC LEAKING, REINFORCED WOUND VAC, NO LONGER LEAKING AT THIS TIME

## 2020-01-07 NOTE — NUR
ADMINISTERED MEDS. EDUCATED ON SIDE EFFECTS. PATIENT TOLERATED WELL. WILL CONTINUE TO 
MONITOR THE PATIENT

## 2020-01-07 NOTE — NUR
RECEIVED PATIENT FROM AM SHIFT IN STABLE CONDITION. MEDSUR PATIENT. A/O X4, ABLE TO MAKE 
NEEDS KNOWN. NO C/O PAIN. NO S/SX ACUTE DISTRESS. RESPIRATIONS EVEN, UNLABORED. IV SITE 
PATENT/INTACT, INFUSING FLUIDS WELL. ISOLATION PRECAUTIONS OBSERVED BY ALL STAFF. CALL LIGHT 
WITHIN REACH. WILL CONTINUE TO MONITOR.

## 2020-01-07 NOTE — NUR
PATIENT C/O PAIN. SCHEDULED PAIN MEDICATION GIVEN AS ORDERED. DUE MEDS GIVEN. ALL OTHER 
NEEDS ATTENDED TO. PATIENT CLEAN/DRY. CALL LIGHT WITHIN REACH. WILL CONTINUE TO MONITOR.

## 2020-01-07 NOTE — NUR
RECEIVED BEDSIDE REPORT FROM NIGHT SHIFT NURSE. PATIENT IS AWAKE, ALERT AND ORIENTEDX4. NO 
SIGNS OF DISTRESS ON RA. SKIN HAS WOUND VAC ON R LABIA S/P I&D. IV ON R FA 22G INFUSING NS 
AT 80. CLEAN, DRY AND INTACT. PATIENT IS AMBULATORY. CONTINENT, W ROBB. ABLE TO MAKE NEEDS 
KNOWN. CONTACT FOR NECROTIZING FASCIITIS. BED IN LOW POSITION. CALL LIGHT WITHIN REACH. WILL 
CONTINUE TO MONITOR THE PATIENT

## 2020-01-07 NOTE — NUR
PATIENT WATCHING TV IN BED. NO C/O PAIN. NO S/SX ACUTE DISTRESS. ISOLATION PRECAUTIONS 
OBSERVED BY STAFF. CALL LIGHT WITHIN REACH. WILL CONTINUE TO MONITOR.

## 2020-01-07 NOTE — NUR
ADMINISTERED PAIN MED. PATIENT TOLERATED WELL. EDUCATED ON SIDE EFFECTS. WILL CONTINUE TO 
MONITOR THE PATIENT.

## 2020-01-07 NOTE — NUR
1/7/20 RD FOLLOW UP COMPLETED



PLEASE REFER TO NUTRITION ASSESSMENT UNDER CARE ACTIVITY FOR ESTIMATED NUTRITIONAL NEEDS. 



1. WHEN MEDICALLY APPROPRIATE, CONSIDER CLEAR LIQUID DIET AND ADVANCE AS TOLERATED TO CCHO 
60 GM DIET. (MET) 

2. CONTINUE CCHO 60GM DIET AS TOLERATED 

3. RECOMMEND GLUCERNA SHAKE BID TO OPTIMIZE NUTRITION INTAKE

4. RECOMMEND LEONILA PACKET 1X DAILY TO SUPPORT WOUND HEALING 

5. RD TO FOLLOW UP 2-3 DAYS, HIGH RISK. 



LYLA JOSE RD

## 2020-01-08 NOTE — NUR
SPOKE TO DR. LEUNG WITH ORDER OBTAINED TO CHANGE WOUND VAC CARE TO 3XW ON MONDAY, WEDNESDAY 
AND FRIDAY. PT. TO DISCHARGE WITH HOME HEALTH CARE FOLLOW UP WOUND VAC CARE.

## 2020-01-08 NOTE — NUR
PATIENT CONTINUES IN STABLE CONDITION. DUE MEDS GIVEN AS ORDERED. NO C/O PAIN. NO S/SX ACUTE 
DISTRESS. SIGNIFICANT OTHER AT BEDSIDE. CALL LIGHT WITHIN REACH. WILL CONTINUE TO MONITOR.

## 2020-01-08 NOTE — NUR
PATIENT ASLEEP. NO S/SX ACUTE DISTRESS. NO C/O PAIN. IV FLUIDS INFUSING WELL. CALL LIGHT 
WITHIN REACH. WILL CONTINUE TO MONITOR.

## 2020-01-08 NOTE — NUR
RECEIVED BEDSIDE REPORT FROM NIGHT SHIFT NURSE. PATIENT IS AWAKE, ALERT AND ORIENTEDX4. NO 
SIGNS OF DISTRESS ON RA. SKIN HAS S/P I&D R LABIA W WOUND VAC. NO LEAKING. AMBULATORY W 
ASSIST PATIENT HAS ROBB. CONTINENT. R FA 22G INFUSING NS AT 80. CLEAN, DRY AND INTACT. ABLE 
TO MAKE NEEDS KNOWN. BED IN LOW POSITION, CALL LIGHT WITHIN REACH. WILL CONTINUE TO MONITOR 
THE PATIENT. FAMILY AT BEDSIDE

## 2020-01-08 NOTE — NUR
RECEIVED FROM AM RN ION BED SITTING UP. AWAKE AND ALERT. ABLE TO VERBALIZE NEEDS WELL IN 
ENGLISH. WITH WOUND VACUUM FROM INCISION AND DRAINAGE DONE TO RIGHT VAGINAL LABIA. CARE 
PLANS FOR THE NIGHT DISCUSSED WITH HER AND CALL LIGHT WITH IN REACH AT BEDSIDE. ABLE TO USE 
CALL LIGHT FOR HELP WELL. ROBB CATHETER IN PLACE AND DRAINING WELL WITH YELLOW URINE.

## 2020-01-08 NOTE — NUR
PER VENKATA SHE SPOKE TO SRIKANTH AND OKAY TO TAKE HOSPITAL UNIT WHEN PT. DISCHARGE HOME. SPOKE 
TO VENKATA  TO OBTAIN [facility] AUTHORIZATION NUMBER IF DISCHARGE PT. WITH HOSPITAL 
UNIT TO HOME.

## 2020-01-08 NOTE — NUR
REASSESSED PAIN LEVEL, PATIENT STATED PAIN IS TOLERABLE AT THIS TIME. NO S/SX ACUTE 
DISTRESS. PATIENT CLEAN/DRY. CALL LIGHT WITHIN REACH. ISOLATION PRECAUTIONS OBSERVED BY ALL 
STAFF.

## 2020-01-08 NOTE — NUR
PT. BLOOD SUGAR PER FINGERSTICK 161. ADMINISTERED 2 UNITS OF HUMALOG INSULIN. REQUESTED FOR 
BED BUCK AND STATED SHE WANTS TO TRY AND GO BM. BED PAN IN  PLACE BY CNA. CALL LIGHT WITH IN 
REACH.

## 2020-01-08 NOTE — NUR
ADMINISTERED INSULIN AND PRN NAUSEA MED. PATIENT TOLERATED WELL. EDUCATED ON SIDE EFFECTS. 
WILL CONTINUE TO MONITOR THE PATIENT.

## 2020-01-08 NOTE — NUR
MADE ROUNDS. PATIENT AWAKE, RESTING COMFORTABLY. NO C/O PAIN. NO S/SX ACUTE DISTRESS. CALL 
LIGHT WITHIN REACH. ISOLATION PRECAUTIONS OBSERVED BY ALL STAFF. WILL CONTINUE TO MONITOR.

## 2020-01-08 NOTE — NUR
WOUND VAC DRESSING CHANGED WITH PRIMARY RN AND PHOTO TAKEN, PT. TOLERATE PROCEDURE WELL. MAY 
NEED TO KEEP F/C IN FOR SHORT TERM FOR WOUND HEALING, DRESSING MAY LOOSENING AND CAUSE 
LEAKAGE DURING GEORGE-CARE. RISKS AND BENEFITS EXPLAINED TO PT. PT VERBALIZES UNDERSTANDING.

## 2020-01-09 NOTE — NUR
REINFORCED DRESSING AND PT TOLERATED WELL. PT IS RESTING ON BED AT THIS TIME. NO SIGNS OF 
DISTRESS NOTED. SAFETY MEASURES IN PLACE.

## 2020-01-09 NOTE — NUR
BLOOD SUGAR FOR AM PER FINGERSTICK  217. COVERED WITH HUMALOG INSULIN 4 UNITS. ABLE TO USE 
CALL LIGHT FOR HELP. NO SOB. A/O X 4. ROM X 4. IVF SITE INTACT AND NO INFILTRATION NOTED. 
FOR DISCHARGE PLANNING TODAY WITH HOME HEALTH .

## 2020-01-09 NOTE — NUR
PT IS RESTING ON BED AT THIS TIME. STATED PAIN IS TOLERABLE. NO SIGNS OF DISTRESS NOTED. 
SAFETY MEASURES IN PLACE. WOUND VAC IS ON CONTINUE SUCTIONING.

## 2020-01-09 NOTE — NUR
PT RECEIVED HER LUNCH TRAY. ADMINISTERED 2 UNITS OF HUMALOG FOR BLOOD GLUCOSE 195, PT 
TOLERATED WELL. PT IS GETTING READY TO EAT DINNER. NO SIGNS OF DISTRESS NOTED. SAFETY 
MEASURES IN PLACE. BED IN LOW POSITION AND CALL LIGHT WITHIN REACH.

## 2020-01-09 NOTE — NUR
PT. WOUND VAC SEALED RT LEAKING A LITTLE TO LEFT SIDE RT FROM ROBB CATHETER MOVING.  TURNED 
AND CLEANED BY CNA. SPOUSE AT BEDSIDE. REQUESTED AT THIS TIME FOR 2 TABLETS OF PERCOCET. 
WILL MEDICATE AS REQUESTED.

## 2020-01-09 NOTE — NUR
DR MARCIAL IS ON FLOOR. INFORMED DR MARCIAL THAT PT HAS 10/10 PAIN AND DR MARCIAL WAS 
AWARE AND STATED THAT HE WILL PUT IN AN ORDER TO ADDRESS PAIN. DR MARCIAL ORDERED TO HOLD 
LEVONOX THIS AM. AWAITING FOR DR EVANS ORDER.

## 2020-01-09 NOTE — NUR
MEDICATED WITH PRN PAIN MED NORCO FOR 10/10 PAIN, MED ED PROVIDED AND PT TOLERATED MED WELL. 
PT IS RESTING ON BED AT THIS TIME AND FAMILY IS BY BEDSIDE. NO SIGNS OF DISTRESS NOTED. 
SAFETY MEASURES IN PLACE. BED IN LOW POSITION AND CALL LIGHT WITHIN REACH.

## 2020-01-09 NOTE — NUR
ARRIVED BEDSIDE FOR DC.  CHECKED ALL CABINETS AND PACKING PT'S BELONGING. NO 
SIGNS OF DISTRESS NOTED.

## 2020-01-09 NOTE — NUR
PT HAVE 1 SMALL SOFT BM, WITH ASSIST FROM CNA, CLEANSED PT AND CHANGED PT INTO CLEAN GOWN. 
PT TOLERATED WELL. PT PREFERRED TO GO HOME WITH THE PINK GOWN THAN HER CLOTHES. PT CALLED 
 AND INFORMED THAT SHE CAN GO HOME AFTER DINNER.  NO SIGNS OF DISTRESS NOTED. SAFETY 
MEASURES IN PLACE. BED IN LOW POSITION AND CALL LIGHT WITHIN REACH.

## 2020-01-09 NOTE — NUR
RECEIVED BEDSIDE REPORT FROM NIGHT SHIFT NURSE FOR CONTINUITY OF CARE. PT AWAKE AND RESTING 
ON BED AT THIS TIME. PT IS AAOX4 AND ABLE TO COMMUNICATE APPROPRIATELY. RESPIRATION EVEN AND 
UNLABORED ON RA. DENIED PAIN, SOB, AND DIZZINESS AT THIS TIME. NO SIGNS OF DISTRESS NOTED. 
IV ON RFA 18G, CLEAN AND INTACT, INFUSING AS PER MD ORDER. WOUND ON R LABIA NOTED, ATTACHED 
TO WOUND VAC AND ON CONTINUE SUCTIONING. ROBB IN PLACE AND DRAINING WITH GRAVITY. DISCUSSED 
PLAN OF CARE WITH PT AND PT VERBALIZED OK. FAMILY BY BEDSIDE. SAFETY MEASURES IN PLACE. BED 
IN LOW POSITION AND CALL LIGHT WITHIN REACH. INSTRUCTED PT TO USE THE CALL LIGHT FOR ANY 
ASSISTANCE AND PT WAS AWARE.

## 2020-01-09 NOTE — NUR
PT IS RESTING ON BED AT THIS TIME AND STATED PAIN IS UNDER CONTROL AND TOLERABLE.  IS 
BY BEDSIDE. NO SIGNS OF DISTRESS NOTED. SAFETY MEASURES IN PLACE. BED IN LOW POSITION AND 
CALL LIGHT WITHIN REACH. INSTRUCTED PT TO USE THE CALL LIGHT FOR ANY ASSISTANCE AND PT SAID 
OK.

## 2020-01-09 NOTE — NUR
ADMINISTERED BP MED AS PER MD ORDER, MED ED PROVIDED AND PT VERBALIZED UNDERSTANDING. PT 
TOLERATED MED WELL. PT IS COMPLAINING IN PAIN AND EXPLAINED THAT PAGE DR FOR AN ORDER. PT 
SAID OK. FAMILY BY BEDSIDE. SAFETY MEASURES IN PLACE. BED IN LOW POSITION AND CALL LIGHT 
WITHIN REACH.

## 2020-01-09 NOTE — NUR
PT RECEIVED HER LUNCH TRAY. ADMINISTERED 2 UNITS OF HUMALOG FOR BLOOD GLUCOSE 185, PT 
TOLERATED WELL. PT IS GETTING READY TO EAT LUNCH.

## 2020-01-09 NOTE — NUR
PT COMPLAINED THAT SHE FEELS NAUSEA AFTER SHE EATS, ADMINISTERED PRN PHENERGAN AS PER MD 
ORDER, MED ED PROVIDED TO PT AND  BY BEDSIDE, BOTH VERBALIZED UNDERSTANDING. PT AWAKE 
AND TALKING TO  AT THIS TIME. SAFETY MEASURES IN PLACE. BED IN LOW POSITION AND CALL 
LIGHT WITHIN REACH.

## 2020-01-09 NOTE — NUR
PT COMPLAINED SHE HAS 8/10 PAIN AROUND HER INCISION, MEDICATED WITH PRN NORCO, MED ED 
PROVIDED AND PT TOLERATED WELL. NO SIGNS OF DISTRESS NOTED. SAFETY MEASURES IN PLACE. BED IN 
LOW POSITION AND CALL LIGHT WITHIN REACH.

## 2020-01-09 NOTE — NUR
AM PERSONAL HYGIENE RENDERED. SPOUSE AT BEDSIDE. CALL LIGHT WITH IN REACH AND ABLE TO USE 
IT. NO COMPLAINTS DONE.

## 2020-01-10 NOTE — NUR
Patient discharged with v/s stable. Written and verbal after care instructions 
given and explained REGARDING HEALING WOUND. 

Patient verbalized understanding. Ambulatory with steady gait. All questions 
addressed prior to discharge. Advised to follow up with HOME HEALTH NURSE AND 
DR MUSA.

INSTRUCTED TO HAVE PRESCRIPTION FOR ANTIBIOTICS FILLED. PT GIVEN EXTRA 
NECESSARY SUPPLIES IN CASE OF WOUND DRAINAGE AGAIN.

ROBB SECURED TO PTS L LEG

## 2020-01-10 NOTE — NUR
C/O VAGINAL PAIN 10/10 POST ADMISSION FOR [facility] CYST. PT WAS ORIGINALLY 
SEEN ON DECEMBER 28TH 2019 FOR I&D. RETURNED ON 12/31/19 FOR WORSENING S/S AND 
WAS ADMITTED AND DISCHARGED LAST NIGHT PER PT. STATES NOW SITE HAS INCREASED 
BLEEDING. BLOOD CLOT AND BLEEDING NOTICED AT SITE. PT HAS ROBB CATH IN PLACE. 
YELLOW URINE NOTED.

PT WAS INSTRUCTED TO FOLLOW UP WITH HOME HEALTH NURSE POST DISCHARGE FROM 
ADMISSION AND TAKE ANTIBIOTICS. PT STILL NEEDS TO FOLLOW UP.



HX: DM, HTN


-------------------------------------------------------------------------------

Addendum: 01/10/20 at 1011 by [email or web address]

-------------------------------------------------------------------------------

R LABIA VAGINAL WOUND POST SURGERY FOR ABSCESS

## 2020-01-27 NOTE — NUR
C/O VOMITTING AND EPIGASTRIC PAIN X 3 DAYS. ABD IS ROUND, SOFT, TENDER, ACTIVE 
BS. VSS. RATES PAIN 10/10 IN THE EPIGASTRIC REGION. VOMTING (MORE THAN 5 
EPISODE). NO DYSURIA, FEVER. A & 0 X4.



NKA.



PMH: DM, ANXIETY, DEPRESSION.

## 2020-01-27 NOTE — NUR
Patient discharged with v/s stable. Written and verbal after care instructions 
given and explained. 

Patient alert, oriented and verbalized understanding of instructions. 
Ambulatory with steady gait. All questions addressed prior to discharge. ID 
band removed. Patient advised to follow up with PMD. Rx of ZOFRAN AND 
AMOXICILLIN given. Patient educated on indication of medication including 
possible reaction and side effects. Opportunity to ask questions provided and 
answered.DR. TRUJILLO MADE AWARE PT'S BP WHEN DISCHARGING. PT STATED SHE HAS BP 
MEDS AND HIGH CHOLESTROL MEDS, SHE DID NOT TAKE IT DUE TO SHE HAD VOMITING. PT 
ALSO HAS PAIN MEDICATION NORCO AT HOME. EDUCATED PT TO DRINK MORE WATER AS SHE 
DID CT WITH CONTRAST TODAY, PT AND PT'S  VERBALIZED UNDERSTANDING.

## 2020-02-21 NOTE — NUR
27 Y/O FEMALE C/O N/V, ABD PAIN THAT RADIATES TO THE MIDDLE AND LOWER BACK X 5 
DAYS, TOOK NORCO 10 FOR THE PAIN 10/10 LAST NIGHT AND THIS MORNING. ABD SOFT 
AND TENDER. PT STATES HAD AN ABCESS REMOVED FROM [facility] 1/7/2020. PT STATES 
STARTED NEW ANXIETY MED RECENTLY. 



MEDICAL HX: DEPRESSION, DIABETES, ANXIETY

NKA

## 2020-02-21 NOTE — NUR
Patient discharged with v/s stable. Written and verbal after care instructions 
given and explained. 

Patient alert, oriented and verbalized understanding of instructions. 
Ambulatory with steady gait. All questions addressed prior to discharge. ID 
band removed. Patient advised to follow up with PMD. Rx of DELGADO BELCHER 
given. Patient educated on indication of medication including possible reaction 
and side effects. Opportunity to ask questions provided and answered.

## 2020-02-21 NOTE — NUR
REPORT RECEIVED FROM LOW MCMAHAN. ASSUMMED CARE OF PATIENT AT THIS TIME. PATIENT 
STABLE AT TIME OF TRANSFER OF CARE.

## 2020-02-23 NOTE — NUR
PT HEARD CRYING AT BEDSIDE. REPORTS RETURN OF PAIN 10/10. PT STATES "IT FEELS 
REALLY SHARP". DR. HARDY MADE AWARE.

## 2020-02-23 NOTE — NUR
Patient discharged with v/s stable. Written and verbal after care instructions 
given and explained. 

Patient alert, oriented and verbalized understanding of instructions. 
Ambulatory with to car. All questions addressed prior to discharge. ID band 
removed. Patient advised to follow up with PMD. Rx of Bactrim, Zofran and 
Naprosyn given. Patient educated on indication of medication including possible 
reaction and side effects. Opportunity to ask questions provided and answered.

## 2020-02-23 NOTE — NUR
27 Y/O FEMALE PRESENTS TO ED WITCH C/O SEVERE UPPER ABD PAIN RADIATING TO LOWER 
BACK X1 WK. ACTIVELY VOMITING. NORMAL BOWEL MOVEMETS. X4 BOWEL SOUNDS PRESENT. 
TENDER TO PALPATION. NON-RIGID. POSITIONED IN BED FOR COMFORT. FAMILY AT 
BEDSIDE. X2 SIDE RAILS UP. HOB ELEVATED. ER MD AWARE. CONTINUE TO MONITOR.

## 2020-09-11 NOTE — NUR
PER DR. CARDENAS REQUEST, PT WAS SEEN TO RIGHT LABIA SURGICASL WOUND AND WOUND 
VAC DRESSING CHAHGED. SURGICAL WOUND PHOTO TAKEN, WOUND BED IS RED WITH 100% 
GRANULATING TISSUE, NO ODOR, BLOOD CLOTS ATTACHED TO WOUND VAC FOAM DRESSING, 
APPROXIMATELLY 270ML DARK RED DRAINAGE OBSERVED FROM CANISTER. CANISTER 
CHANGED,AND INSTRUCT PT. AND  TO GENESIS  AND RECORD THE AMOUNT OF DRAINAGE 
DAILY. PT. TOLERATE PROCEDURES WELL, WOUND VAC CARE AND INSTRUCTIONS 
DEMONSTRATED TO PT. AND  AND HOME HEALTH WOUND CARE DISCUSSED, ALL 
QUESTIONS ANSWERED, PT. AND  VERBALIZES UNDERSTANDING. PT. AMBULATED FEW 
STEPS AROUND BEDSIDE, NO S/S OF WOUND VAC LEAKAGE. POC DISCUSSED WITH PRIMARY 
RN, MAY DISCHARGE WITH WOUND CARE SUPPLIES. Other

## 2021-12-01 NOTE — NUR
ASSISTED PT. TO USE BEDPAN. HAS BM /SMALL AND SOFT IN CONSISTENCY. WOUND VACUUM WORKING WELL 
AND NO NOTED LEAKING. RE-CONFIRMED WITH CHARGE NURSE FOR EFFECTIVITY OF SUCTION AND 
CONFIRMED THAT IT IS VACUUMING WELL WITH SEROUS SANGUINOUS OUTPUT. PT. EXCITED TO GO HOME. 
SPOUSE IN HERE VISITING. Discussed the importance of blood sugar control in the prevention of ocular complications.

## 2022-02-07 NOTE — NUR
27 YO/F BIB  W C/O N/V, BACK, CHEST, AND ABDOMINAL PAIN BEGINING IN THE 
MORNING WHILE AT THE DIALYSIS CLINIC, 10/10 PAIN PRESSURE/SHARP. PT REPORTS SHE 
WAS TOLD AT THE CLINIC HER POTASSIUM WAS HIGH AND ALSO NEEDED A PARACENTISIS 
AND REFERRED PT TO ER. PT REPORTS SOME SOB O2 % ON RA. PT HAS A L ARM 
DIALYSIS PORT. PT DENIES ANY FEVER, CHILLS, DIARRHEA, HEADACHE, COUGH OR OTHER 
SYMPTOMS. PT LAYING SUPINE IN BED, CONNECTED TO MONITOR W VSS.  AT 
BEDSIDE.



PMH: HTN, DIABETES, DIALYSIS (MWF), DEPRESSION, ANXIETY, BLIND

ALLERGIES: DENIES

## 2022-02-08 NOTE — NUR
-------------------------------------------------------------------------------

            *** Note mary ann in ED - 02/08/22 at 0708 by [email or web address] ***            

-------------------------------------------------------------------------------

PER HOUSE SUP ERYTHROCIN NOT AVAILABLE IN THE HOSPITAL. MD BORGES AWARE.

## 2022-02-08 NOTE — NUR
assumed patient care, awaiting for tele bed, pt here for chest pain and 
vomiting. On assesment pt is aox4, denies any chest pain at this time. On 
continous cardiac monitorinf and oxygen inhalation at 3L NC.

## 2022-02-08 NOTE — NUR
PATIENT HAS BEEN SCREENED AND CATEGORIZED AS HIGH NUTRITION RISK. PATIENT WILL BE SEEN 
WITHIN 1-2 DAYS OF ADMISSION.



2/8/22-2/9/22



MOUNA LUNA RD

## 2022-02-08 NOTE — NUR
PATIENT RESTING IN BED, VSS, RESPIRATIONS EVEN AND UNLABORED. SAFETY 
PRECAUTIONS PUT INTO PLACE. WILL CONTINUE TO MONITOR.

## 2022-02-08 NOTE — NUR
PT APPEARS TO BE RESTING W EYES CLOSED IN SUPINE POSITION. CONNECTED TO MONITOR 
W VSS. BREATHING EVEN AND UNLABORED. WILL CONTINUE TO MONITOR.

## 2022-02-08 NOTE — NUR
PATIENT RESTING COMFORTABLY IN BED, NO SIGNS OF DISTRESS NOTED AT THIS TIME. 
WILL CONTINUE TO MONITOR.

## 2022-02-09 NOTE — NUR
PATIENT DISCHARGED HOME WITH  NIMESH.



PATIENT AMBULATORY WITH ASSISTANCE, ABLE TO TRANSFER FROM HOSPITAL BED TO WHEELCHAIR BY 
SELF, AXO X4, DENIES PAIN, SOB OR RESPIRATORY DISTRESS. DISCHARGE INSTRUCTIONS EXPLAINED, 
PATIENT AND  VERBALIZED UNDERSTANDING. ALL BELONGINGS RETURNED TO PATIENT. IV 
CATHETER REMOVED WITHOUT COMPLICATION, CARDIAC MONITOR REMOVED AND RETURNED TO MONITOR 
TECHNICIAN.

## 2022-02-09 NOTE — NUR
PATIENT AWAKE BUT BIND NEEDS ASISTANCE TO BATH ROOM PATIENT TO ROOM 2050 2/8/22 C/O OF BACK 
PAIN MEDICATED WITH

MORPHINE 4 MG IVP. SINUS ON MONITOR. HAD DIALYSIS 2/8/22 TOOK OUT 3.5 FLUID. PATIENT SKIN 
INTACT. TEMP 98.7. PATIENT

HAS 3 LITERS N/C ON SAT 98%. START NS AT 75 HOUR. ERYTHROMYCIN 500MG NOT AVAILABLE. 
SUPERVISOR STATED.NOT ABLE

TO HANG WAS DUE AT 0000. AND 0600. PATIENT RECEIVED ZOFRAN 4MG IVP AT 2300. AND RECEIVED 
MORPHINE AT 2335.

## 2022-02-09 NOTE — NUR
PT EXPERIENCING AN ANXIETY ATTACK, PT GIVEN NON PHARMACOLOGICAL METHODS W/ NO RELIEF. PT 
GIVEN ATIVAN 0.5MG PER MD ORDER.

## 2022-02-09 NOTE — NUR
PT IS AOX4, ON 2LNC SATTING ABOVE 93%. PT HAVING VOMITING EPISODES RELIEVED WITH MEDICATION. 
PT REPORTS PAIN RELIEVED WITH MEDICATION. PT HAD CRITICAL POTASSIUM IN AM WITH ORDERS FOR 
MEDICATIONS. TO TO RECEIVED DIALYSIS, PT AWARE AND CONSENT SIGNED.

## 2022-02-09 NOTE — NUR
2/9/22 RD INITIAL ASSESSMENT COMPLETED



PLEASE REFER TO NUTRITION ASSESSMENT UNDER CARE ACTIVITY FOR ESTIMATED NUTRITIONAL NEEDS. 



1. CONTINUE CLEAR LIQUID DIET AS TOLERATED 

2. WHEN/IF MEDICALLY APPROPRIATE, ADVANCE TO RENAL/CCHO 60GM DIET AS TOLERATED

3. RD TO FOLLOW-UP 2-3 DAYS, HIGH RISK 



MOUNA LUNA, AKIL

## 2022-02-09 NOTE — NUR
DR. WELLS AWARE OF PT'S REPEAT BMP RESULTS AND DIALYSIS OUTPUT. PER DR. WELLS PT IS 
READY FOR DISCHARGE AND HE WILL PUT IN THE DC ORDERS SHORTLY.PT IN STABLE CONDITION.

## 2023-07-30 NOTE — NUR
PT COMPLAINED SHE HAS 10/10 PAIN, MORPHINE ORDER WAS DC. REPOSITIONED PT AND IT DOES NOT 
HELP WITH PAIN RELIEF. PAGED DR MARCIAL AND AWAITING FOR  TO CALL BACK FOR ORDER. Oriented - self; Oriented - place; Oriented - time